# Patient Record
Sex: FEMALE | Race: WHITE | URBAN - METROPOLITAN AREA
[De-identification: names, ages, dates, MRNs, and addresses within clinical notes are randomized per-mention and may not be internally consistent; named-entity substitution may affect disease eponyms.]

---

## 2018-01-04 ENCOUNTER — OFFICE VISIT (OUTPATIENT)
Dept: INTERNAL MEDICINE | Facility: CLINIC | Age: 23
End: 2018-01-04
Payer: COMMERCIAL

## 2018-01-04 VITALS
HEART RATE: 71 BPM | SYSTOLIC BLOOD PRESSURE: 131 MMHG | WEIGHT: 186.9 LBS | OXYGEN SATURATION: 97 % | DIASTOLIC BLOOD PRESSURE: 79 MMHG | RESPIRATION RATE: 20 BRPM

## 2018-01-04 DIAGNOSIS — Z23 NEED FOR PROPHYLACTIC VACCINATION WITH TETANUS-DIPHTHERIA (TD): Primary | ICD-10-CM

## 2018-01-04 RX ORDER — CETIRIZINE HYDROCHLORIDE 10 MG/1
10 TABLET ORAL PRN
COMMUNITY

## 2018-01-04 RX ORDER — ALBUTEROL SULFATE 90 UG/1
2 AEROSOL, METERED RESPIRATORY (INHALATION) PRN
COMMUNITY

## 2018-01-04 RX ORDER — NORGESTIMATE AND ETHINYL ESTRADIOL 0.25-0.035
1 KIT ORAL DAILY
COMMUNITY

## 2018-01-04 ASSESSMENT — ENCOUNTER SYMPTOMS
BREAST MASS: 1
SINUS PAIN: 0
INSOMNIA: 1
SNORES LOUDLY: 0
SMELL DISTURBANCE: 0
HYPOTENSION: 0
NUMBNESS: 0
POLYPHAGIA: 0
SINUS CONGESTION: 0
SPUTUM PRODUCTION: 0
SEIZURES: 0
DYSURIA: 0
POOR WOUND HEALING: 0
DISTURBANCES IN COORDINATION: 0
NAIL CHANGES: 0
PALPITATIONS: 0
COUGH DISTURBING SLEEP: 0
WEAKNESS: 0
COUGH: 0
DIFFICULTY URINATING: 0
HEADACHES: 0
MYALGIAS: 0
MUSCLE WEAKNESS: 0
HOT FLASHES: 0
NECK MASS: 0
MEMORY LOSS: 0
EXERCISE INTOLERANCE: 0
CONSTIPATION: 1
TACHYCARDIA: 0
RESPIRATORY PAIN: 0
EXTREMITY NUMBNESS: 0
TREMORS: 0
LOSS OF CONSCIOUSNESS: 0
POSTURAL DYSPNEA: 0
SWOLLEN GLANDS: 0
SLEEP DISTURBANCES DUE TO BREATHING: 0
HALLUCINATIONS: 0
SYNCOPE: 0
DECREASED APPETITE: 0
BLOATING: 1
CLAUDICATION: 0
SHORTNESS OF BREATH: 0
TROUBLE SWALLOWING: 0
POLYDIPSIA: 0
HOARSE VOICE: 0
ALTERED TEMPERATURE REGULATION: 0
ARTHRALGIAS: 0
STIFFNESS: 0
HEMOPTYSIS: 0
BACK PAIN: 0
CHILLS: 0
FLANK PAIN: 0
LEG SWELLING: 0
NECK PAIN: 0
EYE REDNESS: 1
HEMATURIA: 0
WEIGHT GAIN: 0
SKIN CHANGES: 0
TASTE DISTURBANCE: 0
SORE THROAT: 0
FEVER: 0
ABDOMINAL PAIN: 1
WEIGHT LOSS: 0
JOINT SWELLING: 0
TINGLING: 0
LEG PAIN: 0
HYPERTENSION: 0
FATIGUE: 0
WHEEZING: 0
ORTHOPNEA: 0
SPEECH CHANGE: 0
DIARRHEA: 1
MUSCLE CRAMPS: 0
INCREASED ENERGY: 0
PARALYSIS: 0
DIZZINESS: 0
BRUISES/BLEEDS EASILY: 0
NIGHT SWEATS: 0
LIGHT-HEADEDNESS: 0
DECREASED LIBIDO: 0
DYSPNEA ON EXERTION: 0
EYE IRRITATION: 1
BREAST PAIN: 1

## 2018-01-04 ASSESSMENT — PAIN SCALES - GENERAL: PAINLEVEL: NO PAIN (0)

## 2018-01-04 NOTE — PROGRESS NOTES
PRIMARY CARE CENTER         HPI:       Yudi Kilpatrick is a 22 year old female presenting with breast pain and to establish care. She states she does weekly breast exams and of the last 1.5 months she has noticed more pain. Pain is located on the outer and lower portion of her breasts bilaterally.pain only occurs when she palpates with no pain with daily activities. She denies discharge or masses in her breasts. Pain is present at different times and does not seem to correlate to her cycle which are regular due to her OCPs.     Otherwise she notes some increased difficulty sleeping. She started a new job that is busy and more stressful about 6 months ago and has had increased difficulty getting to sleep since. She notes sometimes it takes her 40min-1.5 hrs to get to sleep. She works the evening shift, getting off around 11, and she watches tv for awhile before going to sleep. She occasionally takes melatonin which she says helps.       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   HENT:  Negative for ear pain, hearing loss, tinnitus, nosebleeds, trouble swallowing,  hoarse voice, mouth sores, sore throat, ear discharge, tooth pain, gum tenderness, taste disturbance, smell disturbance, hearing aid, bleeding gums, dry mouth, sinus pain, sinus congestion and neck mass.    Eyes:  Positive for redness, eye dryness and eye irritation.   Respiratory:   Negative for cough, hemoptysis, sputum production, shortness of breath, wheezing, sleep disturbances due to breathing, snores loudly, respiratory pain, dyspnea on exertion, cough disturbing sleep and postural dyspnea.    Cardiovascular:  Negative for chest pain, dyspnea on exertion, palpitations, orthopnea, claudication, leg swelling, fingers/toes turn blue, hypertension, hypotension, syncope, history of heart murmur, chest pain on exertion, chest pain at rest, pacemaker, few scattered varicosities, leg pain, sleep disturbances due to breathing, tachycardia, light-headedness, exercise intolerance and edema.   Gastrointestinal:  Positive for abdominal pain, diarrhea, constipation and bloating.   Genitourinary:  Negative for bladder incontinence, dysuria, urgency, hematuria, flank pain, vaginal discharge, difficulty urinating, genital sores, dyspareunia, decreased libido, nocturia, voiding less frequently, arousal difficulty, abnormal vaginal bleeding, excessive menstruation, menstrual changes, hot flashes, vaginal dryness and postmenopausal bleeding.   Musculoskeletal:  Negative for myalgias, back pain, joint swelling, arthralgias, stiffness, muscle cramps, neck pain, bone pain, muscle weakness and fracture.   Skin:  Negative for nail changes, itching, poor wound healing, rash, hair changes, skin changes, acne, warts, poor wound healing, scarring, flaky skin, Raynaud's phenomenon, sensitivity to sunlight and skin thickening.   Neurological:  Negative for dizziness, tingling, tremors, speech change, seizures, loss of consciousness, weakness, light-headedness, numbness, headaches, disturbances in coordination, extremity numbness, memory  loss, difficulty walking and paralysis.   Endo/Heme:  Negative for anemia, swollen glands and bruises/bleeds easily.   Psychiatric/Behavioral:  Negative for hallucinations and memory loss.    Breast:  Positive for breast mass and breast pain.   Endocrine:  Negative for altered temperature regulation, polyphagia, polydipsia, unwanted hair growth and change in facial hair.    I have personally reviewed and updated the complete ROS on the day of the visit.           Physical Exam:   /79 (BP Location: Right arm, Patient Position: Sitting, Cuff Size: Adult Regular)  Pulse 71  Resp 20  Wt 84.8 kg (186 lb 14.4 oz)  SpO2 97%  There is no height or weight on file to calculate BMI.  Vitals were reviewed       GENERAL APPEARANCE: healthy, alert and no distress     EYES: EOMI, PERRL     HENT: ear canals and TM's normal and mouth without ulcers or lesions     NECK: no adenopathy, no asymmetry, masses, or scars and thyroid normal to palpation     RESP: lungs clear to auscultation - no rales, rhonchi or wheezes     BREAST: normal without masses, or nipple discharge and no palpable axillary masses or adenopathy, tender in outer and inferior breast tissue symmetrically     CV: regular rate and rhythm, normal S1 S2, no S3 or S4 and no murmur, click or rub     ABDOMEN:  soft, nontender, no HSM or masses and bowel sounds normal     MS: extremities normal- no gross deformities noted, no evidence of inflammation in joints, FROM in all extremities.     SKIN: no suspicious lesions or rashes     NEURO: Normal strength and tone, sensory exam grossly normal, mentation intact and speech normal     PSYCH: mentation appears normal. and affect normal    Assessment and Plan      Yudi Kilpatrick is a 22 year old female presenting with breast pain. Problems and plan discussed today outlined below.    Breast pain  Pain to self palpation x1.5 months. No pain in daily activities. Breast exam normal. Pain/tenderness likely normal tenderness of  developing/changing breast tissue. No concerning masses or signs, she is starting her cycle currently so increase in pain likely connected to this.Reasured pt there is no need to be concerned.    Sleep trouble  Problems with sleep latency taking 40min-1.5 to initiate sleep several times a week. Discussed sleep hygiene (avoiding electronics close to bed, leaving the bedroom if not successful initially, relaxing scents, ect). Plan to reassess on next visit.    Need for prophylactic vaccination with tetanus-diphtheria (TD)  -     TDAP ( [employer]RIX AGES 10-64)    Options for treatment and follow-up care were reviewed with the patient. Yudi Kilpatrick engaged in the decision making process and verbalized understanding of the options discussed and agreed with the final plan.    Fouzia Mcclain MD  Jan 4, 2018    Pt was seen and plan of care discussed with Dr. Hester    Pt was seen and examined with Dr. Mcclain.  I agree with her documentation as noted above.    My additional comments: None. Typical fibrocystic changes apparent in bilateral breasts upper/outer quadrants    Parth Hester MD

## 2018-01-04 NOTE — PATIENT INSTRUCTIONS
[facility]: [phone]     [facility] Medication Refill Request Information:  * Please contact your pharmacy regarding ANY request for medication refills.  ** [facility] Prescription Fax = [phone]  * Please allow 3 business days for routine medication refills.  * Please allow 5 business days for controlled substance medication refills.     [facility] Test Result notification information:  *You will be notified with in 7-10 days of your appointment day regarding the results of your test.  If you are on MyChart you will be notified as soon as the provider has reviewed the results and signed off on them.

## 2018-01-04 NOTE — MR AVS SNAPSHOT
After Visit Summary   1/4/2018    Yudi Kilpatrick    MRN: [id number]           Patient Information     Date Of Birth          1995        Visit Information        Provider Department      1/4/2018 1:10 PM Fouzia Mcclain MD [facility]        Today's Diagnoses     Need for prophylactic vaccination with tetanus-diphtheria (TD)    -  1      Care Instructions    Primary Care Center: [phone]     [facility] Medication Refill Request Information:  * Please contact your pharmacy regarding ANY request for medication refills.  ** [facility] Prescription Fax = [phone]  * Please allow 3 business days for routine medication refills.  * Please allow 5 business days for controlled substance medication refills.     [facility] Test Result notification information:  *You will be notified with in 7-10 days of your appointment day regarding the results of your test.  If you are on MyChart you will be notified as soon as the provider has reviewed the results and signed off on them.          Follow-ups after your visit        Who to contact     Please call your clinic at [phone] to:    Ask questions about your health    Make or cancel appointments    Discuss your medicines    Learn about your test results    Speak to your doctor   If you have compliments or concerns about an experience at your clinic, or if you wish to file a complaint, please contact [facility] Patient Relations at [phone] or email us at [email or web address]         Additional Information About Your Visit        MyChart Information     MyChart is an electronic gateway that provides easy, online access to your medical records. With MyChart, you can request a clinic appointment, read your test results, renew a prescription or communicate with your care team.     To sign up for Clarityhart visit the website at [email or web address]   You will be asked to enter  the access code listed below, as well as some personal information. Please follow the directions to create your username and password.     Your access code is: [email or web address]  Expires: 2018  6:31 AM     Your access code will  in 90 days. If you need help or a new code, please contact your [facility] or call [phone] for assistance.        Care EveryWhere ID     This is your Care EveryWhere ID. This could be used by other organizations to access your [place] medical records  [id number]        Your Vitals Were     Pulse Respirations Pulse Oximetry             71 20 97%          Blood Pressure from Last 3 Encounters:   18 131/79    Weight from Last 3 Encounters:   18 84.8 kg (186 lb 14.4 oz)              Today, you had the following     No orders found for display       Primary Care Provider Office Phone # Fax #    Fouzia Elana Mcclain [phone]       [place] [address]        Equal Access to Services     JAYMIE NARAYANAN : Hadii aad ku hadasho Soomaali, waaxda luqadaha, qaybta kaalmada adeegyada, waxay idiin hayaan adriana khshaheed braxton . So [facility] [phone].    ATENCIÓN: Si habla español, tiene a sarah disposición servicios gratuitos de asistencia lingüística. Llame al [phone].    We comply with applicable federal civil rights laws and [place] laws. We do not discriminate on the basis of race, color, national origin, age, disability, sex, sexual orientation, or gender identity.            Thank you!     Thank you for choosing [facility]  for your care. Our goal is always to provide you with excellent care. Hearing back from our patients is one way we can continue to improve our services. Please take a few minutes to complete the written survey that you may receive in the mail after your visit with us. Thank you!             Your Updated Medication List - Protect others around you: Learn how  to safely use, store and throw away your medicines at www.disposemymeds.org.          This list is accurate as of: 1/4/18  2:15 PM.  Always use your most recent med list.                   Brand Name Dispense Instructions for use Diagnosis    albuterol 108 (90 BASE) MCG/ACT Inhaler    PROAIR HFA/PROVENTIL HFA/VENTOLIN HFA     Inhale 2 puffs into the lungs as needed for shortness of breath / dyspnea or wheezing Prn before exercise.Ariana James LPN 1:11 PM on 1/4/2018        CALCIUM-VITAMIN D PO      Take 1 tablet by mouth daily Calcium 1200 -Vitamin D 1000.Ariana James LPN 1:13 PM on 1/4/2018        cetirizine 10 MG tablet    zyrTEC     Take 10 mg by mouth as needed for allergies        norgestimate-ethinyl estradiol 0.25-35 MG-MCG per tablet    ORTHO-CYCLEN, SPRINTEC     Take 1 tablet by mouth daily

## 2018-01-04 NOTE — NURSING NOTE
Chief Complaint   Patient presents with     Establish Care     establish care/provider     Breast Problem     painful when doing breast examines   Ariana James LPN 1:15 PM on 1/4/2018  Rooming Note  Health Maintenance   Health Maintenance Due   Topic Date Due     TETANUS IMMUNIZATION (SYSTEM ASSIGNED)  06/22/2012    All health maintenance items discussed and pended.    Ariana James LPN 1:18 PM on 1/4/2018